# Patient Record
(demographics unavailable — no encounter records)

---

## 2024-10-28 NOTE — ASSESSMENT
[FreeTextEntry1] : 53-year-old female with a 7 to 8 mm mid ureteral stone.  She will continue tamsulosin 0.4 mg daily for medical expulsive therapy and follow-up in 2 weeks for renal ultrasound.  Plan Urinalysis Urine culture I reviewed images of patient CT abdomen pelvis and discussed results with the patient demonstrating a 7 to 8 mm mid ureteral stone with hydronephrosis Tamsulosin 0.4 mg daily for medical expulsive therapy Toradol 10 mg as needed every 8 hours for severe pain: Prescription sent Follow-up in 2 weeks for renal ultrasound and KUB   Patient is being seen today for evaluation and management of a chronic and longitudinal ongoing condition and I am of the primary treating physician.

## 2024-10-28 NOTE — HISTORY OF PRESENT ILLNESS
[FreeTextEntry1] : 54 yo female with a 7 mm mid ureteral stone. She has been having some improvement in symptoms but continues to have pain.  This is her first stone event

## 2024-11-11 NOTE — ASSESSMENT
[FreeTextEntry1] : 53-year-old female with a 7 to 8 mm mid ureteral stone.  It appears she is passed the stone.  Her renal ultrasound was negative for hydronephrosis and her KUB did not demonstrate any ureteral stones.  Plan Urinalysis reviewed with 15 RBCs per hpf.  This is likely due to her obstructing stone.  Will repeat in 6 months Urine culture reviewed: Negative  I have reviewed images of patient's renal us and discussed results with patient demonstrating no hydronephrosis I have reviewed KUB images and discussed with patient demonstrating no identifiable stones Stone sent for biochemical analysis  Stop Tamsulosin  Renal us in 6 months    Patient is being seen today for evaluation and management of a chronic and longitudinal ongoing condition and I am of the primary treating physician.

## 2024-11-11 NOTE — HISTORY OF PRESENT ILLNESS
[FreeTextEntry1] : 54 yo female with a 7 mm mid ureteral stone. She has been having some improvement in symptoms but continues to have pain.  This is her first stone event.  She was started on tamsulosin for medical expulsive therapy and has passed a stone.  Her renal ultrasound done today did not demonstrate any hydronephrosis.  She did a KUB today which was reviewed and does not demonstrate any stones within the path of the ureter

## 2025-06-09 NOTE — ASSESSMENT
[FreeTextEntry1] : 54-year-old female with a 7 to 8 mm mid ureteral stone.  It appears she is passed the stone.  Her renal ultrasound was negative for hydronephrosis and her KUB did not demonstrate any ureteral stones. We reviewed dietary modifications for stone prevention including increased fluid intake, decrease animal proteins, increased citrus fruit and daily recommended levels of calcium and sodium  Plan  I have reviewed images of patient's renal us and discussed results with patient demonstrating no hydro or stones I have reviewed KUB images and discussed with patient demonstrating no identifiable stones Stone oath reviewed - 100% CaOX Reviewed dietary modification for stone prevention    Patient is being seen today for evaluation and management of a chronic and longitudinal ongoing condition and I am of the primary treating physician.

## 2025-06-09 NOTE — HISTORY OF PRESENT ILLNESS
[FreeTextEntry1] : 53 yo female with a 7 mm mid ureteral stone. She passed stone with tamsulosin. Renal us done today negative for hydro or stones  Stone analysis - 100% CaOx